# Patient Record
Sex: FEMALE | Race: ASIAN | NOT HISPANIC OR LATINO
[De-identification: names, ages, dates, MRNs, and addresses within clinical notes are randomized per-mention and may not be internally consistent; named-entity substitution may affect disease eponyms.]

---

## 2018-01-19 ENCOUNTER — RX RENEWAL (OUTPATIENT)
Age: 68
End: 2018-01-19

## 2018-01-23 ENCOUNTER — RX RENEWAL (OUTPATIENT)
Age: 68
End: 2018-01-23

## 2018-02-14 ENCOUNTER — RX RENEWAL (OUTPATIENT)
Age: 68
End: 2018-02-14

## 2018-04-09 ENCOUNTER — APPOINTMENT (OUTPATIENT)
Dept: CARDIOLOGY | Facility: CLINIC | Age: 68
End: 2018-04-09

## 2018-04-18 ENCOUNTER — APPOINTMENT (OUTPATIENT)
Dept: CARDIOLOGY | Facility: CLINIC | Age: 68
End: 2018-04-18
Payer: MEDICARE

## 2018-04-18 ENCOUNTER — NON-APPOINTMENT (OUTPATIENT)
Age: 68
End: 2018-04-18

## 2018-04-18 VITALS
WEIGHT: 155 LBS | DIASTOLIC BLOOD PRESSURE: 82 MMHG | BODY MASS INDEX: 28.52 KG/M2 | RESPIRATION RATE: 16 BRPM | HEIGHT: 62 IN | SYSTOLIC BLOOD PRESSURE: 125 MMHG | HEART RATE: 75 BPM

## 2018-04-18 DIAGNOSIS — Z78.9 OTHER SPECIFIED HEALTH STATUS: ICD-10-CM

## 2018-04-18 DIAGNOSIS — Z86.39 PERSONAL HISTORY OF OTHER ENDOCRINE, NUTRITIONAL AND METABOLIC DISEASE: ICD-10-CM

## 2018-04-18 PROCEDURE — 99213 OFFICE O/P EST LOW 20 MIN: CPT

## 2018-04-18 PROCEDURE — 93000 ELECTROCARDIOGRAM COMPLETE: CPT

## 2018-07-09 ENCOUNTER — RX RENEWAL (OUTPATIENT)
Age: 68
End: 2018-07-09

## 2018-09-25 ENCOUNTER — RX RENEWAL (OUTPATIENT)
Age: 68
End: 2018-09-25

## 2018-10-17 ENCOUNTER — APPOINTMENT (OUTPATIENT)
Dept: CARDIOLOGY | Facility: CLINIC | Age: 68
End: 2018-10-17
Payer: MEDICARE

## 2018-10-17 ENCOUNTER — NON-APPOINTMENT (OUTPATIENT)
Age: 68
End: 2018-10-17

## 2018-10-17 VITALS
RESPIRATION RATE: 16 BRPM | BODY MASS INDEX: 27.6 KG/M2 | DIASTOLIC BLOOD PRESSURE: 80 MMHG | HEART RATE: 64 BPM | SYSTOLIC BLOOD PRESSURE: 127 MMHG | WEIGHT: 150 LBS | HEIGHT: 62 IN

## 2018-10-17 PROCEDURE — 93000 ELECTROCARDIOGRAM COMPLETE: CPT

## 2018-10-17 PROCEDURE — 99213 OFFICE O/P EST LOW 20 MIN: CPT

## 2019-04-10 ENCOUNTER — APPOINTMENT (OUTPATIENT)
Dept: CARDIOLOGY | Facility: CLINIC | Age: 69
End: 2019-04-10
Payer: MEDICARE

## 2019-04-10 ENCOUNTER — NON-APPOINTMENT (OUTPATIENT)
Age: 69
End: 2019-04-10

## 2019-04-10 VITALS
WEIGHT: 151 LBS | RESPIRATION RATE: 15 BRPM | DIASTOLIC BLOOD PRESSURE: 78 MMHG | SYSTOLIC BLOOD PRESSURE: 125 MMHG | HEIGHT: 62 IN | HEART RATE: 70 BPM | BODY MASS INDEX: 27.79 KG/M2

## 2019-04-10 PROCEDURE — 93000 ELECTROCARDIOGRAM COMPLETE: CPT

## 2019-04-10 PROCEDURE — 99213 OFFICE O/P EST LOW 20 MIN: CPT

## 2019-04-10 RX ORDER — METFORMIN HYDROCHLORIDE 500 MG/1
500 TABLET, COATED ORAL
Refills: 0 | Status: COMPLETED | COMMUNITY
End: 2019-04-10

## 2019-05-02 ENCOUNTER — RX RENEWAL (OUTPATIENT)
Age: 69
End: 2019-05-02

## 2019-06-12 RX ORDER — TELMISARTAN AND HYDROCHLOROTHIAZIDE 80; 25 MG/1; MG/1
80-25 TABLET ORAL
Qty: 90 | Refills: 1 | Status: DISCONTINUED | COMMUNITY
Start: 2018-01-19 | End: 2019-06-12

## 2019-07-22 ENCOUNTER — APPOINTMENT (OUTPATIENT)
Dept: CARDIOLOGY | Facility: CLINIC | Age: 69
End: 2019-07-22
Payer: MEDICARE

## 2019-07-22 VITALS
HEIGHT: 62 IN | BODY MASS INDEX: 28.16 KG/M2 | DIASTOLIC BLOOD PRESSURE: 80 MMHG | SYSTOLIC BLOOD PRESSURE: 130 MMHG | RESPIRATION RATE: 15 BRPM | WEIGHT: 153 LBS | HEART RATE: 60 BPM

## 2019-07-22 PROCEDURE — 99213 OFFICE O/P EST LOW 20 MIN: CPT

## 2019-07-23 RX ORDER — LABETALOL HYDROCHLORIDE 200 MG/1
200 TABLET, FILM COATED ORAL DAILY
Qty: 90 | Refills: 1 | Status: COMPLETED | COMMUNITY
Start: 2018-01-23 | End: 2019-07-23

## 2019-07-23 RX ORDER — OMEGA-3/DHA/EPA/FISH OIL 300-1000MG
1000 CAPSULE ORAL DAILY
Refills: 0 | Status: COMPLETED | COMMUNITY
Start: 2019-04-10 | End: 2019-07-23

## 2019-08-28 ENCOUNTER — CLINICAL ADVICE (OUTPATIENT)
Age: 69
End: 2019-08-28

## 2019-08-28 RX ORDER — TELMISARTAN 80 MG/1
80 TABLET ORAL
Qty: 90 | Refills: 1 | Status: DISCONTINUED | COMMUNITY
Start: 2019-06-12 | End: 2019-08-28

## 2019-09-06 ENCOUNTER — CLINICAL ADVICE (OUTPATIENT)
Age: 69
End: 2019-09-06

## 2019-09-11 ENCOUNTER — RX RENEWAL (OUTPATIENT)
Age: 69
End: 2019-09-11

## 2019-10-03 ENCOUNTER — RX RENEWAL (OUTPATIENT)
Age: 69
End: 2019-10-03

## 2019-10-08 ENCOUNTER — APPOINTMENT (OUTPATIENT)
Dept: CARDIOLOGY | Facility: CLINIC | Age: 69
End: 2019-10-08
Payer: MEDICARE

## 2019-10-08 VITALS
HEIGHT: 62 IN | WEIGHT: 151 LBS | HEART RATE: 64 BPM | DIASTOLIC BLOOD PRESSURE: 80 MMHG | BODY MASS INDEX: 27.79 KG/M2 | SYSTOLIC BLOOD PRESSURE: 127 MMHG | RESPIRATION RATE: 15 BRPM

## 2019-10-08 PROCEDURE — 93306 TTE W/DOPPLER COMPLETE: CPT

## 2019-10-08 PROCEDURE — ZZZZZ: CPT

## 2019-10-08 PROCEDURE — 93015 CV STRESS TEST SUPVJ I&R: CPT

## 2019-10-08 PROCEDURE — 99213 OFFICE O/P EST LOW 20 MIN: CPT | Mod: 25

## 2020-07-01 ENCOUNTER — NON-APPOINTMENT (OUTPATIENT)
Age: 70
End: 2020-07-01

## 2020-07-01 ENCOUNTER — APPOINTMENT (OUTPATIENT)
Dept: CARDIOLOGY | Facility: CLINIC | Age: 70
End: 2020-07-01
Payer: MEDICARE

## 2020-07-01 VITALS
DIASTOLIC BLOOD PRESSURE: 79 MMHG | WEIGHT: 144 LBS | HEIGHT: 62 IN | BODY MASS INDEX: 26.5 KG/M2 | HEART RATE: 70 BPM | RESPIRATION RATE: 15 BRPM | SYSTOLIC BLOOD PRESSURE: 123 MMHG

## 2020-07-01 PROCEDURE — 99213 OFFICE O/P EST LOW 20 MIN: CPT

## 2020-07-01 PROCEDURE — 93000 ELECTROCARDIOGRAM COMPLETE: CPT

## 2020-09-29 ENCOUNTER — APPOINTMENT (OUTPATIENT)
Dept: CARDIOLOGY | Facility: CLINIC | Age: 70
End: 2020-09-29
Payer: MEDICARE

## 2020-09-29 VITALS
HEIGHT: 62 IN | RESPIRATION RATE: 15 BRPM | HEART RATE: 70 BPM | WEIGHT: 148 LBS | BODY MASS INDEX: 27.23 KG/M2 | SYSTOLIC BLOOD PRESSURE: 140 MMHG | DIASTOLIC BLOOD PRESSURE: 90 MMHG

## 2020-09-29 PROCEDURE — 99213 OFFICE O/P EST LOW 20 MIN: CPT

## 2020-12-21 ENCOUNTER — APPOINTMENT (OUTPATIENT)
Dept: CARDIOLOGY | Facility: CLINIC | Age: 70
End: 2020-12-21
Payer: MEDICARE

## 2020-12-21 VITALS
HEIGHT: 62 IN | SYSTOLIC BLOOD PRESSURE: 135 MMHG | RESPIRATION RATE: 16 BRPM | OXYGEN SATURATION: 97 % | DIASTOLIC BLOOD PRESSURE: 88 MMHG | TEMPERATURE: 97.8 F | HEART RATE: 78 BPM | WEIGHT: 152 LBS | BODY MASS INDEX: 27.97 KG/M2

## 2020-12-21 PROCEDURE — 99213 OFFICE O/P EST LOW 20 MIN: CPT

## 2021-03-29 ENCOUNTER — LABORATORY RESULT (OUTPATIENT)
Age: 71
End: 2021-03-29

## 2021-03-29 ENCOUNTER — NON-APPOINTMENT (OUTPATIENT)
Age: 71
End: 2021-03-29

## 2021-03-29 ENCOUNTER — APPOINTMENT (OUTPATIENT)
Dept: CARDIOLOGY | Facility: CLINIC | Age: 71
End: 2021-03-29
Payer: MEDICARE

## 2021-03-29 VITALS
SYSTOLIC BLOOD PRESSURE: 127 MMHG | TEMPERATURE: 97.6 F | WEIGHT: 150 LBS | BODY MASS INDEX: 27.6 KG/M2 | DIASTOLIC BLOOD PRESSURE: 81 MMHG | HEIGHT: 62 IN | HEART RATE: 72 BPM | RESPIRATION RATE: 15 BRPM | OXYGEN SATURATION: 97 %

## 2021-03-29 DIAGNOSIS — R06.00 DYSPNEA, UNSPECIFIED: ICD-10-CM

## 2021-03-29 PROCEDURE — 93000 ELECTROCARDIOGRAM COMPLETE: CPT

## 2021-03-29 PROCEDURE — 99213 OFFICE O/P EST LOW 20 MIN: CPT

## 2021-03-29 PROCEDURE — ZZZZZ: CPT

## 2021-03-29 PROCEDURE — 93306 TTE W/DOPPLER COMPLETE: CPT

## 2021-03-29 NOTE — ASSESSMENT
[FreeTextEntry1] : This is a 70 year year old female here today for follow up cardiac followup evaluation. \par She has a past medical history significant for hypertension, hyperglycemia borderline diabetes mellitus, hypothyroidism, murmur, occasional palpitations.\par \par -Pt is stable from a cardiac standpoint and does not have any complaints at this time. \par -Cardiac risk factors include HTN, HLD, DM.. \par \par -EKG done 7/1/2020 which demonstrated regular sinus rhythm with nonspecific ST-T wave changes, BPM of 78.\par \par -The patient will schedule an Exercise Stress Test rule out significant coronary artery disease and will schedule \par an Echo Doppler examination to evaluate murmur, left ventricular function, chamber size, and rule out hypertrophy.\par \par I have discussed the plan of care with Ms. PRAVIN RANKIN  and she  will follow up in 1 month. \par \par The patient understands that aerobic exercises must be increased to minutes 4 times/week and a detailed discussion of lifestyle modification was done today. \par The patient has a good understanding of the diagnosis, treatment plan and lifestyle modification. \par She will contact me at the office for any questions with their care or any changes in their health status.\par \par

## 2021-03-29 NOTE — DISCUSSION/SUMMARY
[FreeTextEntry1] : This is a 70-year-old female past medical history significant for hypertension, hyperglycemia borderline diabetes mellitus, hypothyroidism, murmur, occasional palpitations, comes in for followup cardiac evaluation.  She denies chest pain, shortness of breath, dizziness or syncope. \par The patient had a normal exercise stress test March 29, 2021.\par She will also have an echo Doppler examination later today to evaluate her left ventricular function, chamber size, murmur, and rule out hypertrophy.\par New blood work will also be done today for lipid panel SMA-20.\par Electrocardiogram done July 1, 2020 demonstrated normal sinus rhythm rate 62 bpm and is otherwise unremarkable.\par The patient will continue on her usual medications.  Her blood pressure is under good control and her lipid profile is at goal.  She will follow-up with her primary care physician regarding her diabetic care.\par The patient understands that aerobic exercises must be increased to 40 minutes 4 times per week. A detailed discussion of lifestyle modification was done today. The patient has a good understanding of the diagnosis, and treatment plan. Lifestyle modification was also outlined.

## 2021-03-29 NOTE — PHYSICAL EXAM
[General Appearance - Well Developed] : well developed [Normal Appearance] : normal appearance [Well Groomed] : well groomed [General Appearance - Well Nourished] : well nourished [No Deformities] : no deformities [General Appearance - In No Acute Distress] : no acute distress [Normal Conjunctiva] : the conjunctiva exhibited no abnormalities [Normal Oral Mucosa] : normal oral mucosa [Normal Jugular Venous A Waves Present] : normal jugular venous A waves present [Normal Jugular Venous V Waves Present] : normal jugular venous V waves present [No Jugular Venous Dinh A Waves] : no jugular venous dinh A waves [Respiration, Rhythm And Depth] : normal respiratory rhythm and effort [Exaggerated Use Of Accessory Muscles For Inspiration] : no accessory muscle use [Auscultation Breath Sounds / Voice Sounds] : lungs were clear to auscultation bilaterally [Bowel Sounds] : normal bowel sounds [Abdomen Soft] : soft [Abnormal Walk] : normal gait [Gait - Sufficient For Exercise Testing] : the gait was sufficient for exercise testing [Nail Clubbing] : no clubbing of the fingernails [Cyanosis, Localized] : no localized cyanosis [Skin Color & Pigmentation] : normal skin color and pigmentation [Skin Turgor] : normal skin turgor [] : no rash [Oriented To Time, Place, And Person] : oriented to person, place, and time [Affect] : the affect was normal [Mood] : the mood was normal [5th Left ICS - MCL] : palpated at the 5th LICS in the midclavicular line [Normal] : normal [No Precordial Heave] : no precordial heave was noted [Normal Rate] : normal [Normal S1] : normal S1 [Normal S2] : normal S2 [No Gallop] : no gallop heard [S3] : no S3 [S4] : no S4 [Click] : no click [Pericardial Rub] : no pericardial rub [II] : a grade 2 [Right Carotid Bruit] : no bruit heard over the right carotid [Left Carotid Bruit] : no bruit heard over the left carotid [Right Femoral Bruit] : no bruit heard over the right femoral artery [Left Femoral Bruit] : no bruit heard over the left femoral artery [2+] : left 2+ [No Abnormalities] : the abdominal aorta was not enlarged and no bruit was heard [No Pitting Edema] : no pitting edema present

## 2021-03-29 NOTE — REASON FOR VISIT
[Follow-Up - Clinic] : a clinic follow-up of [Dyspnea] : dyspnea [Hyperlipidemia] : hyperlipidemia [Hypertension] : hypertension [Mitral Regurgitation] : mitral regurgitation [FreeTextEntry1] : This is a 70 year year old female here today for follow up cardiac followup evaluation. \par She has a past medical history significant for hypertension, hyperglycemia borderline diabetes mellitus, hypothyroidism, murmur, occasional palpitations.\par \par Today she is feeling generally well. She does have occasional TUCKER when going up some stairs.\par She denies fever, chills, weight loss, malaise, rash, alteration bowel habits, weakness, abdominal  pain, bloating, changes in urination, visual disturbances, chest pain, headaches, dizziness, heart palpitations, recent episodes of syncope or falls at this time.\par \par

## 2021-09-20 ENCOUNTER — NON-APPOINTMENT (OUTPATIENT)
Age: 71
End: 2021-09-20

## 2021-09-20 ENCOUNTER — APPOINTMENT (OUTPATIENT)
Dept: CARDIOLOGY | Facility: CLINIC | Age: 71
End: 2021-09-20
Payer: MEDICARE

## 2021-09-20 VITALS
WEIGHT: 146 LBS | DIASTOLIC BLOOD PRESSURE: 84 MMHG | BODY MASS INDEX: 26.87 KG/M2 | HEIGHT: 62 IN | OXYGEN SATURATION: 98 % | HEART RATE: 69 BPM | SYSTOLIC BLOOD PRESSURE: 138 MMHG | TEMPERATURE: 97.8 F | RESPIRATION RATE: 16 BRPM

## 2021-09-20 PROCEDURE — 93000 ELECTROCARDIOGRAM COMPLETE: CPT

## 2021-09-20 PROCEDURE — 99214 OFFICE O/P EST MOD 30 MIN: CPT | Mod: 25

## 2021-09-20 NOTE — ASSESSMENT
[FreeTextEntry1] : This is a 71 year year old female here today for follow up cardiac followup evaluation. \par She has a past medical history significant for hypertension, hyperglycemia borderline diabetes mellitus, hypothyroidism, murmur, occasional palpitations.\par \par -Pt is stable from a cardiac standpoint and does not have any complaints at this time. \par \par -Cardiac risk factors include HTN, HLD, DM.\par \par BLOOD PRESSURE:\par -BP is well controlled in today's visit and patient is on Labetalol 100mg PO BID and Telmisartan HCTZ 80/12.5mg PO DAILY. \par \par -I have discussed the importance of maintaining good BP control and reviewed the newest guidelines with the patient while re-enforcing dietary sodium restrictions to no more than 2-3 g daily, DASH diet, life style modifications as well as the goal of maintaining ideal body weight with the patient today. I have advised the patient to avoid the use of over-the-counter medications/ supplements especially NSAIDS.\par \par I have reviewed with Ms. RANKIN that serious health consequences can occur when blood pressure is not well controlled and the need for strict compliance with medication and that optimal control can significantly reduce the risk of cardiovascular disease stroke, heart attack and other organ damage. They verbally expressed understanding of the fore mentioned serious health consequences to me today.\par \par BLOOD WORK:\par -New blood work was done by her PCP on 9/13/2021 which demonstrated normal lipid values. \par -The patient remains on Rosuvastatin 10mg PO DAILY.\par \par CHOLESTEROL CONTROL:\par -Patient will continue the advised  TLC diet and to continue follow-up for treatment of hyperlipidemia and repeat blood testing with diet and exercise. I have discussed different exercises and the importance of maintenance of optimal body weight. The importance of staying within guidelines and recommendations was stressed to the patient today and they acknowledged that they understand this to me verbally.\par \par  -Ms. RANKIN was educated and advised that failure to follow-up with my medical recommendations to lower cholesterol can result in severe health consequences therefore, they will continuing a low saturated and low fat diet and to avoid excessive carbohydrates to help reduce triglycerides and that lowering LDL levels is associated with a significant decrease in serious cardiac events including but not limited to heart attack stroke and overall death. We will continue lipid lowering agents as advised based on blood test results and the patient understands to call if they develop severe muscle discomfort or if they have a reddish tinted discoloration in their urine.\par \par DIABETIC CONTROL:\par The patient's blood glucose consistent with their diabetes. A1C is reported to be 6.9%.\par I will advise the patient to keep try to stay on a low carbohydrate diet lose weight and exercise to reduce spikes in their blood sugar.  The importance of monitoring blood sugar regularly and HgBA1c level were reviewed. I have also discussed annual eye exams to prevent blindness,  monitoring urine microalbumin regularly to check for kidney damage and the importance of proper foot care and regularly checking feet to prevent sores and possibly loss of limbs was reviewed. \par \par TESTING/REPORTS:\par -EKG done Sep 20, 2021 which demonstrated regular sinus rhythm with nonspecific ST-T wave changes, BPM of 69.\par \par -The patient had an echocardiogram done on 3/29/2021 demonstrated mild MR, AR and TR with normal left ventricular systolic function. \par -EF on echo reported to be 65%.\par \par -The patient had a normal exercise stress test done on 3/29/2021.\par \par PLAN:\par -She will continue with her usual medications and will contact the office if she is having any complaints between now and their next follow up appointment.\par -She will follow up with her endocrinologist as needed.\par \par I have discussed the plan of care with Ms. PRAVIN RANKIN  and she  will follow up in 3 months. She is compliant with all of her medications.\par \par The patient understands that aerobic exercises must be increased to minutes 4 times/week and a detailed discussion of lifestyle modification was done today. \par The patient has a good understanding of the diagnosis, treatment plan and lifestyle modification. \par She will contact me at the office for any questions with their care or any changes in their health status.\par \par The plan of care was discussed with the patient with supervision physician Dr. Micky Kurtz and will be carried out as noted above.\par \par Valeriano STUART \par

## 2021-09-20 NOTE — PHYSICAL EXAM
[Well Developed] : well developed [Well Nourished] : well nourished [No Acute Distress] : no acute distress [Normal Venous Pressure] : normal venous pressure [No Carotid Bruit] : no carotid bruit [Normal S1, S2] : normal S1, S2 [No Murmur] : no murmur [No Rub] : no rub [Clear Lung Fields] : clear lung fields [Good Air Entry] : good air entry [No Respiratory Distress] : no respiratory distress  [Soft] : abdomen soft [Non Tender] : non-tender [No Masses/organomegaly] : no masses/organomegaly [Normal Bowel Sounds] : normal bowel sounds [Normal Gait] : normal gait [No Edema] : no edema [No Cyanosis] : no cyanosis [No Clubbing] : no clubbing [No Varicosities] : no varicosities [No Rash] : no rash [No Skin Lesions] : no skin lesions [Moves all extremities] : moves all extremities [No Focal Deficits] : no focal deficits [Alert and Oriented] : alert and oriented [Normal Speech] : normal speech [Normal memory] : normal memory [General Appearance - Well Developed] : well developed [Normal Appearance] : normal appearance [Well Groomed] : well groomed [General Appearance - Well Nourished] : well nourished [No Deformities] : no deformities [General Appearance - In No Acute Distress] : no acute distress [Normal Conjunctiva] : the conjunctiva exhibited no abnormalities [Normal Oral Mucosa] : normal oral mucosa [Normal Jugular Venous A Waves Present] : normal jugular venous A waves present [Normal Jugular Venous V Waves Present] : normal jugular venous V waves present [No Jugular Venous Dinh A Waves] : no jugular venous dinh A waves [Respiration, Rhythm And Depth] : normal respiratory rhythm and effort [Exaggerated Use Of Accessory Muscles For Inspiration] : no accessory muscle use [Auscultation Breath Sounds / Voice Sounds] : lungs were clear to auscultation bilaterally [Bowel Sounds] : normal bowel sounds [Abdomen Soft] : soft [Abnormal Walk] : normal gait [Gait - Sufficient For Exercise Testing] : the gait was sufficient for exercise testing [Nail Clubbing] : no clubbing of the fingernails [Cyanosis, Localized] : no localized cyanosis [Skin Color & Pigmentation] : normal skin color and pigmentation [Skin Turgor] : normal skin turgor [] : no rash [Oriented To Time, Place, And Person] : oriented to person, place, and time [Affect] : the affect was normal [Mood] : the mood was normal [5th Left ICS - MCL] : palpated at the 5th LICS in the midclavicular line [Normal] : normal [No Precordial Heave] : no precordial heave was noted [Normal Rate] : normal [Normal S1] : normal S1 [Normal S2] : normal S2 [No Gallop] : no gallop heard [II] : a grade 2 [2+] : left 2+ [No Abnormalities] : the abdominal aorta was not enlarged and no bruit was heard [No Pitting Edema] : no pitting edema present [S3] : no S3 [S4] : no S4 [Click] : no click [Pericardial Rub] : no pericardial rub [Right Carotid Bruit] : no bruit heard over the right carotid [Left Carotid Bruit] : no bruit heard over the left carotid [Right Femoral Bruit] : no bruit heard over the right femoral artery [Left Femoral Bruit] : no bruit heard over the left femoral artery

## 2021-09-20 NOTE — REASON FOR VISIT
[CV Risk Factors and Non-Cardiac Disease] : CV risk factors and non-cardiac disease [Follow-Up - Clinic] : a clinic follow-up of [Dyspnea] : dyspnea [Hyperlipidemia] : hyperlipidemia [Hypertension] : hypertension [Mitral Regurgitation] : mitral regurgitation [FreeTextEntry1] : hypothyroidism, murmur, NIDDM

## 2021-09-20 NOTE — DISCUSSION/SUMMARY
[FreeTextEntry1] : Dr. Kurtz-(PRIOR VISIT and PMH WITH Dr. Kurtz): \par This is a 70-year-old female past medical history significant for hypertension, hyperglycemia borderline diabetes mellitus, hypothyroidism, murmur, occasional palpitations, comes in for followup cardiac evaluation.  She denies chest pain, shortness of breath, dizziness or syncope. \par \par The patient had a normal exercise stress test March 29, 2021.\par \par She will also have an echo Doppler examination later today to evaluate her left ventricular function, chamber size, murmur, and rule out hypertrophy.\par \par New blood work will also be done today for lipid panel SMA-20.\par \par Electrocardiogram done July 1, 2020 demonstrated normal sinus rhythm rate 62 bpm and is otherwise unremarkable.\par \par The patient will continue on her usual medications.  Her blood pressure is under good control and her lipid profile is at goal.  She will follow-up with her primary care physician regarding her diabetic care.\par \par The patient understands that aerobic exercises must be increased to 40 minutes 4 times per week. A detailed discussion of lifestyle modification was done today. The patient has a good understanding of the diagnosis, and treatment plan. Lifestyle modification was also outlined.

## 2021-09-20 NOTE — CARDIOLOGY SUMMARY
[___] : [unfilled] [de-identified] : 9/20/2021 [de-identified] : 3/29/2021 [de-identified] : 3/29/2021

## 2022-03-22 ENCOUNTER — NON-APPOINTMENT (OUTPATIENT)
Age: 72
End: 2022-03-22

## 2022-03-22 ENCOUNTER — APPOINTMENT (OUTPATIENT)
Dept: CARDIOLOGY | Facility: CLINIC | Age: 72
End: 2022-03-22
Payer: MEDICARE

## 2022-03-22 VITALS
HEIGHT: 62 IN | TEMPERATURE: 97 F | SYSTOLIC BLOOD PRESSURE: 122 MMHG | RESPIRATION RATE: 16 BRPM | DIASTOLIC BLOOD PRESSURE: 76 MMHG | HEART RATE: 73 BPM | BODY MASS INDEX: 26.68 KG/M2 | OXYGEN SATURATION: 97 % | WEIGHT: 145 LBS

## 2022-03-22 PROCEDURE — 99214 OFFICE O/P EST MOD 30 MIN: CPT

## 2022-03-22 PROCEDURE — 93000 ELECTROCARDIOGRAM COMPLETE: CPT

## 2022-03-22 NOTE — ASSESSMENT
[FreeTextEntry1] : Prior note nurse practitioner Che[place]ne September 20, 2021::\par \par This is a 71 year year old female here today for follow up cardiac followup evaluation. \par She has a past medical history significant for hypertension, hyperglycemia borderline diabetes mellitus, hypothyroidism, murmur, occasional palpitations.\par \par -Pt is stable from a cardiac standpoint and does not have any complaints at this time. \par \par -Cardiac risk factors include HTN, HLD, DM.\par \par BLOOD PRESSURE:\par -BP is well controlled in today's visit and patient is on Labetalol 100mg PO BID and Telmisartan HCTZ 80/12.5mg PO DAILY. \par \par -I have discussed the importance of maintaining good BP control and reviewed the newest guidelines with the patient while re-enforcing dietary sodium restrictions to no more than 2-3 g daily, DASH diet, life style modifications as well as the goal of maintaining ideal body weight with the patient today. I have advised the patient to avoid the use of over-the-counter medications/ supplements especially NSAIDS.\par \par I have reviewed with Ms. RANKIN that serious health consequences can occur when blood pressure is not well controlled and the need for strict compliance with medication and that optimal control can significantly reduce the risk of cardiovascular disease stroke, heart attack and other organ damage. They verbally expressed understanding of the fore mentioned serious health consequences to me today.\par \par BLOOD WORK:\par -New blood work was done by her PCP on 9/13/2021 which demonstrated normal lipid values. \par -The patient remains on Rosuvastatin 10mg PO DAILY.\par \par CHOLESTEROL CONTROL:\par -Patient will continue the advised  TLC diet and to continue follow-up for treatment of hyperlipidemia and repeat blood testing with diet and exercise. I have discussed different exercises and the importance of maintenance of optimal body weight. The importance of staying within guidelines and recommendations was stressed to the patient today and they acknowledged that they understand this to me verbally.\par \par  -Ms. RANKIN was educated and advised that failure to follow-up with my medical recommendations to lower cholesterol can result in severe health consequences therefore, they will continuing a low saturated and low fat diet and to avoid excessive carbohydrates to help reduce triglycerides and that lowering LDL levels is associated with a significant decrease in serious cardiac events including but not limited to heart attack stroke and overall death. We will continue lipid lowering agents as advised based on blood test results and the patient understands to call if they develop severe muscle discomfort or if they have a reddish tinted discoloration in their urine.\par \par DIABETIC CONTROL:\par The patient's blood glucose consistent with their diabetes. A1C is reported to be 6.9%.\par I will advise the patient to keep try to stay on a low carbohydrate diet lose weight and exercise to reduce spikes in their blood sugar.  The importance of monitoring blood sugar regularly and HgBA1c level were reviewed. I have also discussed annual eye exams to prevent blindness,  monitoring urine microalbumin regularly to check for kidney damage and the importance of proper foot care and regularly checking feet to prevent sores and possibly loss of limbs was reviewed. \par \par TESTING/REPORTS:\par -EKG done Sep 20, 2021 which demonstrated regular sinus rhythm with nonspecific ST-T wave changes, BPM of 69.\par \par -The patient had an echocardiogram done on 3/29/2021 demonstrated mild MR, AR and TR with normal left ventricular systolic function. \par -EF on echo reported to be 65%.\par \par -The patient had a normal exercise stress test done on 3/29/2021.\par \par PLAN:\par -She will continue with her usual medications and will contact the office if she is having any complaints between now and their next follow up appointment.\par -She will follow up with her endocrinologist as needed.\par \par I have discussed the plan of care with Ms. PRAVIN RANKIN  and she  will follow up in 3 months. She is compliant with all of her medications.\par \par The patient understands that aerobic exercises must be increased to minutes 4 times/week and a detailed discussion of lifestyle modification was done today. \par The patient has a good understanding of the diagnosis, treatment plan and lifestyle modification. \par She will contact me at the office for any questions with their care or any changes in their health status.\par \par The plan of care was discussed with the patient with supervision physician Dr. Micky Kurtz and will be carried out as noted above.\par \par Valeriano STUART \par

## 2022-03-22 NOTE — DISCUSSION/SUMMARY
[FreeTextEntry1] : This is a 71-year-old female past medical history significant for hypertension, hyperglycemia borderline diabetes mellitus, hypothyroidism, murmur, occasional palpitations, comes in for followup cardiac evaluation.  She denies chest pain, shortness of breath, dizziness or syncope. \par Electrocardiogram done March 22, 2022 demonstrate normal sinus rhythm rate of 73 bpm is otherwise remarkable for first-degree atrioventricular block.\par Blood work done March 8, 2022 demonstrated a cholesterol of 111, triglycerides of 92, HDL of 40, LDL calculated 53 mg/dL.\par The patient had a normal exercise stress test March 29, 2021.\par The patient will remain on her usual medications.  She will also increase her daily aerobic exercise.\par Echo Doppler examination done March 29, 2021 demonstrated calcified mitral annulus and mild mitral valve regurgitation, minimal aortic valve regurgitation, mild tricuspid valve regurgitation, thinning and dyskinesis of the interatrial septum, with normal ejection fraction of 65%.\par She will continue on her current diet and exercise program.\par \par Electrocardiogram done July 1, 2020 demonstrated normal sinus rhythm rate 62 bpm and is otherwise unremarkable.\par \par The patient understands that aerobic exercises must be increased to 40 minutes 4 times per week. A detailed discussion of lifestyle modification was done today. The patient has a good understanding of the diagnosis, and treatment plan. Lifestyle modification was also outlined.

## 2022-03-22 NOTE — CARDIOLOGY SUMMARY
[___] : [unfilled] [de-identified] : 9/20/2021 [de-identified] : 3/29/2021 [de-identified] : 3/29/2021

## 2022-03-22 NOTE — PHYSICAL EXAM
[Well Developed] : well developed [Well Nourished] : well nourished [No Acute Distress] : no acute distress [Normal Venous Pressure] : normal venous pressure [No Carotid Bruit] : no carotid bruit [Normal S1, S2] : normal S1, S2 [No Murmur] : no murmur [No Rub] : no rub [Clear Lung Fields] : clear lung fields [Good Air Entry] : good air entry [No Respiratory Distress] : no respiratory distress  [Soft] : abdomen soft [Non Tender] : non-tender [No Masses/organomegaly] : no masses/organomegaly [Normal Bowel Sounds] : normal bowel sounds [Normal Gait] : normal gait [No Edema] : no edema [No Cyanosis] : no cyanosis [No Clubbing] : no clubbing [No Varicosities] : no varicosities [No Rash] : no rash [No Skin Lesions] : no skin lesions [Moves all extremities] : moves all extremities [No Focal Deficits] : no focal deficits [Normal Speech] : normal speech [Alert and Oriented] : alert and oriented [Normal memory] : normal memory [General Appearance - Well Developed] : well developed [Normal Appearance] : normal appearance [Well Groomed] : well groomed [General Appearance - Well Nourished] : well nourished [No Deformities] : no deformities [General Appearance - In No Acute Distress] : no acute distress [Normal Conjunctiva] : the conjunctiva exhibited no abnormalities [Normal Oral Mucosa] : normal oral mucosa [Normal Jugular Venous A Waves Present] : normal jugular venous A waves present [Normal Jugular Venous V Waves Present] : normal jugular venous V waves present [No Jugular Venous Dinh A Waves] : no jugular venous dinh A waves [Respiration, Rhythm And Depth] : normal respiratory rhythm and effort [Exaggerated Use Of Accessory Muscles For Inspiration] : no accessory muscle use [Auscultation Breath Sounds / Voice Sounds] : lungs were clear to auscultation bilaterally [Bowel Sounds] : normal bowel sounds [Abdomen Soft] : soft [Abnormal Walk] : normal gait [Gait - Sufficient For Exercise Testing] : the gait was sufficient for exercise testing [Nail Clubbing] : no clubbing of the fingernails [Cyanosis, Localized] : no localized cyanosis [Skin Color & Pigmentation] : normal skin color and pigmentation [Skin Turgor] : normal skin turgor [] : no rash [Oriented To Time, Place, And Person] : oriented to person, place, and time [Affect] : the affect was normal [Mood] : the mood was normal [5th Left ICS - MCL] : palpated at the 5th LICS in the midclavicular line [Normal] : normal [No Precordial Heave] : no precordial heave was noted [Normal Rate] : normal [Normal S1] : normal S1 [Normal S2] : normal S2 [No Gallop] : no gallop heard [II] : a grade 2 [2+] : left 2+ [No Abnormalities] : the abdominal aorta was not enlarged and no bruit was heard [No Pitting Edema] : no pitting edema present [S3] : no S3 [S4] : no S4 [Click] : no click [Pericardial Rub] : no pericardial rub [Right Carotid Bruit] : no bruit heard over the right carotid [Left Carotid Bruit] : no bruit heard over the left carotid [Right Femoral Bruit] : no bruit heard over the right femoral artery [Left Femoral Bruit] : no bruit heard over the left femoral artery

## 2022-09-30 ENCOUNTER — NON-APPOINTMENT (OUTPATIENT)
Age: 72
End: 2022-09-30

## 2022-09-30 ENCOUNTER — APPOINTMENT (OUTPATIENT)
Dept: CARDIOLOGY | Facility: CLINIC | Age: 72
End: 2022-09-30

## 2022-09-30 VITALS
HEART RATE: 59 BPM | RESPIRATION RATE: 18 BRPM | WEIGHT: 143 LBS | DIASTOLIC BLOOD PRESSURE: 72 MMHG | BODY MASS INDEX: 26.31 KG/M2 | OXYGEN SATURATION: 99 % | TEMPERATURE: 97.2 F | HEIGHT: 62 IN | SYSTOLIC BLOOD PRESSURE: 118 MMHG

## 2022-09-30 PROCEDURE — 93000 ELECTROCARDIOGRAM COMPLETE: CPT

## 2022-09-30 PROCEDURE — 99214 OFFICE O/P EST MOD 30 MIN: CPT | Mod: 25

## 2022-09-30 NOTE — PHYSICAL EXAM
[Well Developed] : well developed [Well Nourished] : well nourished [No Acute Distress] : no acute distress [Normal Venous Pressure] : normal venous pressure [No Carotid Bruit] : no carotid bruit [Normal S1, S2] : normal S1, S2 [No Murmur] : no murmur [No Rub] : no rub [Clear Lung Fields] : clear lung fields [Good Air Entry] : good air entry [No Respiratory Distress] : no respiratory distress  [Soft] : abdomen soft [Non Tender] : non-tender [No Masses/organomegaly] : no masses/organomegaly [Normal Bowel Sounds] : normal bowel sounds [Normal Gait] : normal gait [No Edema] : no edema [No Cyanosis] : no cyanosis [No Clubbing] : no clubbing [No Varicosities] : no varicosities [No Rash] : no rash [No Skin Lesions] : no skin lesions [Moves all extremities] : moves all extremities [No Focal Deficits] : no focal deficits [Normal Speech] : normal speech [Alert and Oriented] : alert and oriented [Normal memory] : normal memory [General Appearance - Well Developed] : well developed [Normal Appearance] : normal appearance [Well Groomed] : well groomed [General Appearance - Well Nourished] : well nourished [No Deformities] : no deformities [General Appearance - In No Acute Distress] : no acute distress [Normal Conjunctiva] : the conjunctiva exhibited no abnormalities [Normal Oral Mucosa] : normal oral mucosa [Normal Jugular Venous A Waves Present] : normal jugular venous A waves present [Normal Jugular Venous V Waves Present] : normal jugular venous V waves present [No Jugular Venous Dinh A Waves] : no jugular venous dinh A waves [Respiration, Rhythm And Depth] : normal respiratory rhythm and effort [Exaggerated Use Of Accessory Muscles For Inspiration] : no accessory muscle use [Auscultation Breath Sounds / Voice Sounds] : lungs were clear to auscultation bilaterally [Bowel Sounds] : normal bowel sounds [Abdomen Soft] : soft [Abnormal Walk] : normal gait [Gait - Sufficient For Exercise Testing] : the gait was sufficient for exercise testing [Nail Clubbing] : no clubbing of the fingernails [Cyanosis, Localized] : no localized cyanosis [Skin Color & Pigmentation] : normal skin color and pigmentation [Skin Turgor] : normal skin turgor [] : no rash [Oriented To Time, Place, And Person] : oriented to person, place, and time [Affect] : the affect was normal [Mood] : the mood was normal [5th Left ICS - MCL] : palpated at the 5th LICS in the midclavicular line [Normal] : normal [No Precordial Heave] : no precordial heave was noted [Normal Rate] : normal [Normal S1] : normal S1 [Normal S2] : normal S2 [No Gallop] : no gallop heard [II] : a grade 2 [2+] : left 2+ [No Abnormalities] : the abdominal aorta was not enlarged and no bruit was heard [No Pitting Edema] : no pitting edema present [FreeTextEntry1] : carotid bruit vs transmitted murmur  [S3] : no S3 [S4] : no S4 [Click] : no click [Pericardial Rub] : no pericardial rub [Right Carotid Bruit] : no bruit heard over the right carotid [Left Carotid Bruit] : no bruit heard over the left carotid [Right Femoral Bruit] : no bruit heard over the right femoral artery [Left Femoral Bruit] : no bruit heard over the left femoral artery

## 2022-09-30 NOTE — DISCUSSION/SUMMARY
[FreeTextEntry1] : Dr. Kurtz-(PRIOR VISIT and PMH WITH Dr. Kurtz): \par This is a 71-year-old female past medical history significant for hypertension, hyperglycemia borderline diabetes mellitus, hypothyroidism, murmur, occasional palpitations, comes in for followup cardiac evaluation.  She denies chest pain, shortness of breath, dizziness or syncope. \par \par Electrocardiogram done March 22, 2022 demonstrate normal sinus rhythm rate of 73 bpm is otherwise remarkable for first-degree atrioventricular block.\par \par Blood work done March 8, 2022 demonstrated a cholesterol of 111, triglycerides of 92, HDL of 40, LDL calculated 53 mg/dL.\par \par The patient had a normal exercise stress test March 29, 2021.\par \par Echo Doppler examination done March 29, 2021 demonstrated calcified mitral annulus and mild mitral valve regurgitation, minimal aortic valve regurgitation, mild tricuspid valve regurgitation, thinning and dyskinesis of the interatrial septum, with normal ejection fraction of 65%.\par \par Electrocardiogram done July 1, 2020 demonstrated normal sinus rhythm rate 62 bpm and is otherwise unremarkable.\par \par She will continue on her current diet and exercise program.\par The patient will remain on her usual medications.  She will also increase her daily aerobic exercise.\par \par The patient understands that aerobic exercises must be increased to 40 minutes 4 times per week. A detailed discussion of lifestyle modification was done today. The patient has a good understanding of the diagnosis, and treatment plan. Lifestyle modification was also outlined.

## 2022-09-30 NOTE — CARDIOLOGY SUMMARY
[___] : [unfilled] [de-identified] : 9/20/2021 [de-identified] : 3/29/2021 [de-identified] : 3/29/2021

## 2022-09-30 NOTE — ASSESSMENT
[FreeTextEntry1] : This is a 72 year year old female here today for follow up cardiac evaluation. \par She has a past medical history significant for hypertension, hyperglycemia borderline diabetes mellitus, hypothyroidism, murmur, occasional palpitations.\par \par \par CHIEF COMPLAINT:\par Today she is feeling generally well and does not have any complaints at this time.  She is currently prescribed labetalol 100 mg twice daily, potassium chloride 10 M EQ's 1 tablet twice daily, Rosuvastatin 10 mg daily and telmisartan hydrochlorothiazide 80-12.5 mg daily.  She is on metformin for management of her diabetes.  She may have occasional dyspnea on exertion while going up some steps.  Otherwise she denies any chest pain or heart palpitations at this time.\par \par -Cardiac risk factors include HTN, HLD, DM.\par \par BLOOD PRESSURE:\par -BP is well controlled in today's visit \par \par -I have discussed the importance of maintaining good BP control and reviewed the newest guidelines with the patient while re-enforcing dietary sodium restrictions to no more than 2-3 g daily, DASH diet, life style modifications as well as the goal of maintaining ideal body weight with the patient today. I have advised the patient to avoid the use of over-the-counter medications/ supplements especially NSAIDS.\par \par I have reviewed with Ms. RANKIN that serious health consequences can occur when blood pressure is not well controlled and the need for strict compliance with medication and that optimal control can significantly reduce the risk of cardiovascular disease stroke, heart attack and other organ damage. They verbally expressed understanding of the fore mentioned serious health consequences to me today.\par \par BLOOD WORK:\par -The patient had blood work done from her primary care doctor which demonstrated normal lipid profile done on 9/13/2022.  Total cholesterol was 82, triglycerides 86, HDL 34 and calculated LDL of 31.  Her A1c was 6.9%.\par -Blood work done March 8, 2022 demonstrated a cholesterol of 111, triglycerides of 92, HDL of 40, LDL calculated 53 mg/dL.\par -New blood work was done by her PCP on 9/13/2021 which demonstrated normal lipid values. \par -The patient remains on Rosuvastatin 10mg PO DAILY.\par \par CHOLESTEROL CONTROL:\par -Patient will continue the advised  TLC diet and to continue follow-up for treatment of hyperlipidemia and repeat blood testing with diet and exercise. I have discussed different exercises and the importance of maintenance of optimal body weight. The importance of staying within guidelines and recommendations was stressed to the patient today and they acknowledged that they understand this to me verbally.\par \par  -Ms. RANKIN was educated and advised that failure to follow-up with my medical recommendations to lower cholesterol can result in severe health consequences therefore, they will continuing a low saturated and low fat diet and to avoid excessive carbohydrates to help reduce triglycerides and that lowering LDL levels is associated with a significant decrease in serious cardiac events including but not limited to heart attack stroke and overall death. We will continue lipid lowering agents as advised based on blood test results and the patient understands to call if they develop severe muscle discomfort or if they have a reddish tinted discoloration in their urine.\par \par DIABETIC CONTROL:\par I will advise the patient to keep try to stay on a low carbohydrate diet lose weight and exercise to reduce spikes in their blood sugar.  The importance of monitoring blood sugar regularly and HgBA1c level were reviewed. I have also discussed annual eye exams to prevent blindness,  monitoring urine microalbumin regularly to check for kidney damage and the importance of proper foot care and regularly checking feet to prevent sores and possibly loss of limbs was reviewed. \par \par TESTING/REPORTS:\par -EKG done 09/30/2022 which demonstrated regular sinus rhythm with nonspecific ST-T wave changes BPM of 73.\par \par -The patient had a normal exercise stress test March 29, 2021.\par \par -Echo Doppler examination done March 29, 2021 demonstrated calcified mitral annulus and mild mitral valve regurgitation, minimal aortic valve regurgitation, mild tricuspid valve regurgitation, thinning and dyskinesis of the interatrial septum, with normal ejection fraction of 65%.\par \par -EKG done Sep 20, 2021 which demonstrated regular sinus rhythm with nonspecific ST-T wave changes, BPM of 69.\par \par -Electrocardiogram done July 1, 2020 demonstrated normal sinus rhythm rate 62 bpm and is otherwise unremarkable.\par \par -The patient had an echocardiogram done on 3/29/2021 demonstrated mild MR, AR and TR with normal left ventricular systolic function. \par -EF on echo reported to be 65%.\par \par -The patient had a normal exercise stress test done on 3/29/2021.\par \par PLAN:\par -She will continue with her usual medications and will contact the office if she is having any complaints between now and their next follow up appointment.\par -Patient will schedule a carotid Doppler to evaluate for carotid plaque.\par -The patient will schedule an Exercise Stress Test rule out significant coronary artery disease.\par -The patient will schedule an Echo Doppler examination to evaluate murmur, left ventricular function, chamber size, and rule out hypertrophy. \par -She will follow up with her endocrinologist as needed.\par \par I have discussed the plan of care with Ms. PRAVIN RANKIN  and she  will follow up in 3 months. She is compliant with all of her medications.\par \par The patient understands that aerobic exercises must be increased to minutes 4 times/week and a detailed discussion of lifestyle modification was done today. \par The patient has a good understanding of the diagnosis, treatment plan and lifestyle modification. \par She will contact me at the office for any questions with their care or any changes in their health status.\par \par The plan of care was discussed with the patient with supervision physician Dr. Micky Kurtz and will be carried out as noted above.\par \par Valeriano STUART \par

## 2023-01-23 ENCOUNTER — RX RENEWAL (OUTPATIENT)
Age: 73
End: 2023-01-23

## 2023-04-03 ENCOUNTER — APPOINTMENT (OUTPATIENT)
Dept: CARDIOLOGY | Facility: CLINIC | Age: 73
End: 2023-04-03
Payer: MEDICARE

## 2023-04-03 VITALS
OXYGEN SATURATION: 95 % | SYSTOLIC BLOOD PRESSURE: 126 MMHG | RESPIRATION RATE: 16 BRPM | DIASTOLIC BLOOD PRESSURE: 79 MMHG | BODY MASS INDEX: 27.05 KG/M2 | HEIGHT: 62 IN | TEMPERATURE: 97.5 F | WEIGHT: 147 LBS | HEART RATE: 57 BPM

## 2023-04-03 DIAGNOSIS — Z00.00 ENCOUNTER FOR GENERAL ADULT MEDICAL EXAMINATION W/OUT ABNORMAL FINDINGS: ICD-10-CM

## 2023-04-03 DIAGNOSIS — R09.89 OTHER SPECIFIED SYMPTOMS AND SIGNS INVOLVING THE CIRCULATORY AND RESPIRATORY SYSTEMS: ICD-10-CM

## 2023-04-03 DIAGNOSIS — R06.09 OTHER FORMS OF DYSPNEA: ICD-10-CM

## 2023-04-03 PROCEDURE — 93880 EXTRACRANIAL BILAT STUDY: CPT

## 2023-04-03 PROCEDURE — 93306 TTE W/DOPPLER COMPLETE: CPT

## 2023-04-03 PROCEDURE — 99213 OFFICE O/P EST LOW 20 MIN: CPT | Mod: 25

## 2023-04-03 PROCEDURE — 93015 CV STRESS TEST SUPVJ I&R: CPT

## 2023-04-03 NOTE — CARDIOLOGY SUMMARY
[de-identified] : 9/20/2021 [de-identified] : 3/29/2021 [de-identified] : 3/29/2021 [___] : [unfilled]

## 2023-04-03 NOTE — DISCUSSION/SUMMARY
[FreeTextEntry1] : This is a 72-year-old female past medical history significant for hypertension, hyperglycemia borderline diabetes mellitus, hypothyroidism, murmur, occasional palpitations, comes in for followup cardiac evaluation.  She denies chest pain, shortness of breath, dizziness or syncope.\par The patient had normal exercise stress test April 3, 2023.\par The patient will continue on her usual medication.\par Lipid panel done March 14, 2023 demonstrated a cholesterol 109, triglycerides of 85, HDL 43, LDL calculated 49 mg/dL.\par Electrocardiogram done March 22, 2022 demonstrate normal sinus rhythm rate of 73 bpm is otherwise remarkable for first-degree atrioventricular block.\par Blood work done March 8, 2022 demonstrated a cholesterol of 111, triglycerides of 92, HDL of 40, LDL calculated 53 mg/dL.\par The patient had a normal exercise stress test March 29, 2021.\par The patient will remain on her usual medications.  She will also increase her daily aerobic exercise.\par Echo Doppler examination done March 29, 2021 demonstrated calcified mitral annulus and mild mitral valve regurgitation, minimal aortic valve regurgitation, mild tricuspid valve regurgitation, thinning and dyskinesis of the interatrial septum, with normal ejection fraction of 65%.\par She will continue on her current diet and exercise program.\par \par Electrocardiogram done July 1, 2020 demonstrated normal sinus rhythm rate 62 bpm and is otherwise unremarkable.\par \par The patient understands that aerobic exercises must be increased to 40 minutes 4 times per week. A detailed discussion of lifestyle modification was done today. The patient has a good understanding of the diagnosis, and treatment plan. Lifestyle modification was also outlined.

## 2023-04-03 NOTE — PHYSICAL EXAM
[Well Developed] : well developed [Well Nourished] : well nourished [No Acute Distress] : no acute distress [Normal Venous Pressure] : normal venous pressure [No Carotid Bruit] : no carotid bruit [Normal S1, S2] : normal S1, S2 [No Murmur] : no murmur [No Rub] : no rub [Clear Lung Fields] : clear lung fields [Good Air Entry] : good air entry [No Respiratory Distress] : no respiratory distress  [Soft] : abdomen soft [Non Tender] : non-tender [No Masses/organomegaly] : no masses/organomegaly [Normal Bowel Sounds] : normal bowel sounds [Normal Gait] : normal gait [No Edema] : no edema [No Cyanosis] : no cyanosis [No Clubbing] : no clubbing [No Varicosities] : no varicosities [No Rash] : no rash [No Skin Lesions] : no skin lesions [Moves all extremities] : moves all extremities [No Focal Deficits] : no focal deficits [Normal Speech] : normal speech [Alert and Oriented] : alert and oriented [Normal memory] : normal memory [General Appearance - Well Developed] : well developed [Normal Appearance] : normal appearance [Well Groomed] : well groomed [General Appearance - Well Nourished] : well nourished [No Deformities] : no deformities [General Appearance - In No Acute Distress] : no acute distress [Normal Conjunctiva] : the conjunctiva exhibited no abnormalities [Normal Oral Mucosa] : normal oral mucosa [Normal Jugular Venous A Waves Present] : normal jugular venous A waves present [Normal Jugular Venous V Waves Present] : normal jugular venous V waves present [No Jugular Venous Dinh A Waves] : no jugular venous dinh A waves [Respiration, Rhythm And Depth] : normal respiratory rhythm and effort [Exaggerated Use Of Accessory Muscles For Inspiration] : no accessory muscle use [Auscultation Breath Sounds / Voice Sounds] : lungs were clear to auscultation bilaterally [Bowel Sounds] : normal bowel sounds [Abdomen Soft] : soft [Abnormal Walk] : normal gait [Gait - Sufficient For Exercise Testing] : the gait was sufficient for exercise testing [Nail Clubbing] : no clubbing of the fingernails [Cyanosis, Localized] : no localized cyanosis [Skin Color & Pigmentation] : normal skin color and pigmentation [Skin Turgor] : normal skin turgor [] : no rash [Oriented To Time, Place, And Person] : oriented to person, place, and time [Affect] : the affect was normal [Mood] : the mood was normal [5th Left ICS - MCL] : palpated at the 5th LICS in the midclavicular line [Normal] : normal [No Precordial Heave] : no precordial heave was noted [Normal Rate] : normal [Normal S1] : normal S1 [Normal S2] : normal S2 [No Gallop] : no gallop heard [S3] : no S3 [S4] : no S4 [Click] : no click [Pericardial Rub] : no pericardial rub [II] : a grade 2 [Right Carotid Bruit] : no bruit heard over the right carotid [Left Carotid Bruit] : no bruit heard over the left carotid [Right Femoral Bruit] : no bruit heard over the right femoral artery [Left Femoral Bruit] : no bruit heard over the left femoral artery [2+] : left 2+ [No Abnormalities] : the abdominal aorta was not enlarged and no bruit was heard [No Pitting Edema] : no pitting edema present

## 2023-05-14 PROBLEM — R09.89 CAROTID ARTERY BRUIT: Status: ACTIVE | Noted: 2022-09-30

## 2023-05-14 PROBLEM — R06.09 DYSPNEA ON EXERTION: Status: ACTIVE | Noted: 2021-03-29

## 2023-09-11 ENCOUNTER — APPOINTMENT (OUTPATIENT)
Dept: CARDIOLOGY | Facility: CLINIC | Age: 73
End: 2023-09-11

## 2023-10-16 ENCOUNTER — APPOINTMENT (OUTPATIENT)
Dept: OBGYN | Facility: CLINIC | Age: 73
End: 2023-10-16
Payer: MEDICARE

## 2023-10-16 PROCEDURE — G0101: CPT | Mod: GA

## 2024-01-02 ENCOUNTER — APPOINTMENT (OUTPATIENT)
Dept: CARDIOLOGY | Facility: CLINIC | Age: 74
End: 2024-01-02
Payer: MEDICARE

## 2024-01-02 ENCOUNTER — NON-APPOINTMENT (OUTPATIENT)
Age: 74
End: 2024-01-02

## 2024-01-02 VITALS
HEART RATE: 72 BPM | TEMPERATURE: 97.4 F | OXYGEN SATURATION: 96 % | BODY MASS INDEX: 26.31 KG/M2 | HEIGHT: 62 IN | RESPIRATION RATE: 16 BRPM | WEIGHT: 143 LBS

## 2024-01-02 DIAGNOSIS — I44.0 ATRIOVENTRICULAR BLOCK, FIRST DEGREE: ICD-10-CM

## 2024-01-02 PROCEDURE — 93000 ELECTROCARDIOGRAM COMPLETE: CPT

## 2024-01-02 PROCEDURE — 99214 OFFICE O/P EST MOD 30 MIN: CPT

## 2024-01-02 NOTE — DISCUSSION/SUMMARY
[FreeTextEntry1] : This is a 73-year-old female past medical history significant for hypertension, hyperglycemia borderline diabetes mellitus, hypothyroidism, murmur, occasional palpitations, comes in for followup cardiac evaluation.  She denies chest pain, shortness of breath, dizziness or syncope. Due to computer glitch, the EHR vital sign is not functioning correctly. Blood pressure is 127/80, pulse 70 and regular respirations 16, O2 saturation 98%, height 5 foot 2 weight 143 pounds. Electrocardiogram done January 2, 2024 demonstrated normal sinus rhythm rate 72 bpm is otherwise unremarkable. Lipid profile done September 18, 2023 demonstrated cholesterol of 93, triglycerides of 74, HDL of 38, LDL calculated 39 mg/dL with a hemoglobin A1c of 6.7. She will follow-up with her primary care physician.  She is currently hemodynamically stable and asymptomatic from cardiac standpoint. The patient had normal exercise stress test April 3, 2023. The patient will continue on her usual medication. Lipid panel done March 14, 2023 demonstrated a cholesterol 109, triglycerides of 85, HDL 43, LDL calculated 49 mg/dL. Electrocardiogram done March 22, 2022 demonstrate normal sinus rhythm rate of 73 bpm is otherwise remarkable for first-degree atrioventricular block. Blood work done March 8, 2022 demonstrated a cholesterol of 111, triglycerides of 92, HDL of 40, LDL calculated 53 mg/dL. The patient had a normal exercise stress test March 29, 2021. The patient will remain on her usual medications.  She will also increase her daily aerobic exercise. Echo Doppler examination done March 29, 2021 demonstrated calcified mitral annulus and mild mitral valve regurgitation, minimal aortic valve regurgitation, mild tricuspid valve regurgitation, thinning and dyskinesis of the interatrial septum, with normal ejection fraction of 65%. She will continue on her current diet and exercise program.  Electrocardiogram done July 1, 2020 demonstrated normal sinus rhythm rate 62 bpm and is otherwise unremarkable.  The patient understands that aerobic exercises must be increased to 40 minutes 4 times per week. A detailed discussion of lifestyle modification was done today. The patient has a good understanding of the diagnosis, and treatment plan. Lifestyle modification was also outlined.

## 2024-01-02 NOTE — CARDIOLOGY SUMMARY
[___] : [unfilled] [de-identified] : 9/20/2021 [de-identified] : 3/29/2021 [de-identified] : 3/29/2021

## 2024-01-18 ENCOUNTER — RX RENEWAL (OUTPATIENT)
Age: 74
End: 2024-01-18

## 2024-01-18 RX ORDER — TELMISARTAN AND HYDROCHLOROTHIAZIDE 80; 12.5 MG/1; MG/1
80-12.5 TABLET ORAL
Qty: 90 | Refills: 1 | Status: ACTIVE | COMMUNITY
Start: 2019-08-28 | End: 1900-01-01

## 2024-01-31 ENCOUNTER — NON-APPOINTMENT (OUTPATIENT)
Age: 74
End: 2024-01-31

## 2024-01-31 RX ORDER — POTASSIUM CHLORIDE 750 MG/1
10 TABLET, FILM COATED, EXTENDED RELEASE ORAL TWICE DAILY
Qty: 180 | Refills: 1 | Status: ACTIVE | COMMUNITY
Start: 2018-02-14 | End: 1900-01-01

## 2024-04-11 RX ORDER — METFORMIN HYDROCHLORIDE 500 MG/1
500 TABLET, FILM COATED, EXTENDED RELEASE ORAL
Refills: 0 | Status: ACTIVE | COMMUNITY
Start: 2019-04-10

## 2024-06-18 ENCOUNTER — LABORATORY RESULT (OUTPATIENT)
Age: 74
End: 2024-06-18

## 2024-06-18 ENCOUNTER — NON-APPOINTMENT (OUTPATIENT)
Age: 74
End: 2024-06-18

## 2024-06-18 ENCOUNTER — APPOINTMENT (OUTPATIENT)
Dept: CARDIOLOGY | Facility: CLINIC | Age: 74
End: 2024-06-18
Payer: MEDICARE

## 2024-06-18 VITALS
HEART RATE: 64 BPM | RESPIRATION RATE: 16 BRPM | OXYGEN SATURATION: 98 % | DIASTOLIC BLOOD PRESSURE: 76 MMHG | TEMPERATURE: 97.9 F | SYSTOLIC BLOOD PRESSURE: 114 MMHG | BODY MASS INDEX: 26.13 KG/M2 | HEIGHT: 62 IN | WEIGHT: 142 LBS

## 2024-06-18 DIAGNOSIS — E78.5 HYPERLIPIDEMIA, UNSPECIFIED: ICD-10-CM

## 2024-06-18 DIAGNOSIS — E11.9 TYPE 2 DIABETES MELLITUS W/OUT COMPLICATIONS: ICD-10-CM

## 2024-06-18 DIAGNOSIS — I34.0 NONRHEUMATIC MITRAL (VALVE) INSUFFICIENCY: ICD-10-CM

## 2024-06-18 DIAGNOSIS — I10 ESSENTIAL (PRIMARY) HYPERTENSION: ICD-10-CM

## 2024-06-18 PROCEDURE — 93306 TTE W/DOPPLER COMPLETE: CPT

## 2024-06-18 PROCEDURE — 93000 ELECTROCARDIOGRAM COMPLETE: CPT

## 2024-06-18 PROCEDURE — 99214 OFFICE O/P EST MOD 30 MIN: CPT

## 2024-06-18 PROCEDURE — G2211 COMPLEX E/M VISIT ADD ON: CPT

## 2024-06-18 RX ORDER — LABETALOL HYDROCHLORIDE 100 MG/1
100 TABLET, FILM COATED ORAL
Qty: 180 | Refills: 1 | Status: ACTIVE | COMMUNITY
Start: 2019-07-22 | End: 1900-01-01

## 2024-06-18 RX ORDER — ROSUVASTATIN CALCIUM 10 MG/1
10 TABLET, FILM COATED ORAL
Qty: 90 | Refills: 1 | Status: ACTIVE | COMMUNITY
Start: 2018-04-18 | End: 1900-01-01

## 2024-06-18 NOTE — CARDIOLOGY SUMMARY
[___] : [unfilled] [de-identified] : 9/20/2021 [de-identified] : 3/29/2021 [de-identified] : 3/29/2021

## 2024-06-18 NOTE — DISCUSSION/SUMMARY
[FreeTextEntry1] : This is a 74-year-old female past medical history significant for hypertension, status post left knee replacement April 2024 and planning right knee replacement surgery July 25, 2024, diabetes mellitus, hypothyroidism, murmur, occasional palpitations, comes in for follow-up cardiac evaluation.  She denies chest pain, shortness of breath, dizziness or syncope. Electrocardiogram done June 18, 2024 demonstrated normal sinus rhythm rate 64 bpm is otherwise remarkable for juvenile T wave pattern. The patient will have new blood work done today for electrolytes and lipid profile. Blood work done September 18, 2023 demonstrated cholesterol 93, triglycerides 74, HDL 38, LDL calculated 39 mg/dL. The patient is currently hemodynamically stable and asymptomatic from a cardiac standpoint. Electrocardiogram done January 2, 2024 demonstrated normal sinus rhythm rate 72 bpm is otherwise unremarkable. Lipid profile done September 18, 2023 demonstrated cholesterol of 93, triglycerides of 74, HDL of 38, LDL calculated 39 mg/dL with a hemoglobin A1c of 6.7. She will follow-up with her primary care physician.  She is currently hemodynamically stable and asymptomatic from cardiac standpoint. The patient had normal exercise stress test April 3, 2023. Echo Doppler examination done April 3, 2023 demonstrated mild tricuspid valve regurgitation, trace aortic valve regurgitation, trace pulmonic valve regurgitation, minimal tricuspid valve regurgitation, and normal left ventricular ejection fraction of 64% with mild aortic valve thickening.  There was thinning and dyskinesis of the interatrial septum. Lipid panel done March 14, 2023 demonstrated a cholesterol 109, triglycerides of 85, HDL 43, LDL calculated 49 mg/dL. Electrocardiogram done March 22, 2022 demonstrate normal sinus rhythm rate of 73 bpm is otherwise remarkable for first-degree atrioventricular block. Blood work done March 8, 2022 demonstrated a cholesterol of 111, triglycerides of 92, HDL of 40, LDL calculated 53 mg/dL. The patient had a normal exercise stress test March 29, 2021. The patient will remain on her usual medications.  She will also increase her daily aerobic exercise. Echo Doppler examination done March 29, 2021 demonstrated calcified mitral annulus and mild mitral valve regurgitation, minimal aortic valve regurgitation, mild tricuspid valve regurgitation, thinning and dyskinesis of the interatrial septum, with normal ejection fraction of 65%. She will continue on her current diet and exercise program.  Electrocardiogram done July 1, 2020 demonstrated normal sinus rhythm rate 62 bpm and is otherwise unremarkable.  The patient understands that aerobic exercises must be increased to 40 minutes 4 times per week. A detailed discussion of lifestyle modification was done today. The patient has a good understanding of the diagnosis, and treatment plan. Lifestyle modification was also outlined.

## 2024-07-16 ENCOUNTER — RX RENEWAL (OUTPATIENT)
Age: 74
End: 2024-07-16

## 2024-08-07 ENCOUNTER — RX RENEWAL (OUTPATIENT)
Age: 74
End: 2024-08-07

## 2025-01-07 ENCOUNTER — APPOINTMENT (OUTPATIENT)
Dept: CARDIOLOGY | Facility: CLINIC | Age: 75
End: 2025-01-07
Payer: MEDICARE

## 2025-01-07 ENCOUNTER — NON-APPOINTMENT (OUTPATIENT)
Age: 75
End: 2025-01-07

## 2025-01-07 VITALS
BODY MASS INDEX: 27.48 KG/M2 | WEIGHT: 140 LBS | HEIGHT: 60 IN | RESPIRATION RATE: 16 BRPM | HEART RATE: 16 BPM | OXYGEN SATURATION: 97 % | TEMPERATURE: 98 F

## 2025-01-07 VITALS
HEART RATE: 61 BPM | SYSTOLIC BLOOD PRESSURE: 112 MMHG | TEMPERATURE: 98 F | RESPIRATION RATE: 16 BRPM | DIASTOLIC BLOOD PRESSURE: 73 MMHG | OXYGEN SATURATION: 97 % | BODY MASS INDEX: 26.43 KG/M2 | HEIGHT: 61 IN | WEIGHT: 140 LBS

## 2025-01-07 DIAGNOSIS — E78.5 HYPERLIPIDEMIA, UNSPECIFIED: ICD-10-CM

## 2025-01-07 DIAGNOSIS — E11.9 TYPE 2 DIABETES MELLITUS W/OUT COMPLICATIONS: ICD-10-CM

## 2025-01-07 DIAGNOSIS — I10 ESSENTIAL (PRIMARY) HYPERTENSION: ICD-10-CM

## 2025-01-07 DIAGNOSIS — I34.0 NONRHEUMATIC MITRAL (VALVE) INSUFFICIENCY: ICD-10-CM

## 2025-01-07 PROCEDURE — 93000 ELECTROCARDIOGRAM COMPLETE: CPT

## 2025-01-07 PROCEDURE — 99214 OFFICE O/P EST MOD 30 MIN: CPT

## 2025-01-07 PROCEDURE — G2211 COMPLEX E/M VISIT ADD ON: CPT

## 2025-01-13 ENCOUNTER — RX RENEWAL (OUTPATIENT)
Age: 75
End: 2025-01-13

## 2025-02-03 ENCOUNTER — RX RENEWAL (OUTPATIENT)
Age: 75
End: 2025-02-03

## 2025-07-11 ENCOUNTER — RX RENEWAL (OUTPATIENT)
Age: 75
End: 2025-07-11

## 2025-07-29 DIAGNOSIS — R09.89 OTHER SPECIFIED SYMPTOMS AND SIGNS INVOLVING THE CIRCULATORY AND RESPIRATORY SYSTEMS: ICD-10-CM

## 2025-08-04 ENCOUNTER — RX RENEWAL (OUTPATIENT)
Age: 75
End: 2025-08-04

## 2025-08-05 ENCOUNTER — APPOINTMENT (OUTPATIENT)
Dept: CARDIOLOGY | Facility: CLINIC | Age: 75
End: 2025-08-05
Payer: MEDICARE

## 2025-08-05 VITALS
BODY MASS INDEX: 26.81 KG/M2 | RESPIRATION RATE: 16 BRPM | SYSTOLIC BLOOD PRESSURE: 120 MMHG | TEMPERATURE: 97.2 F | OXYGEN SATURATION: 98 % | HEART RATE: 58 BPM | HEIGHT: 61 IN | WEIGHT: 142 LBS | DIASTOLIC BLOOD PRESSURE: 70 MMHG

## 2025-08-05 DIAGNOSIS — E11.9 TYPE 2 DIABETES MELLITUS W/OUT COMPLICATIONS: ICD-10-CM

## 2025-08-05 DIAGNOSIS — I10 ESSENTIAL (PRIMARY) HYPERTENSION: ICD-10-CM

## 2025-08-05 DIAGNOSIS — E78.5 HYPERLIPIDEMIA, UNSPECIFIED: ICD-10-CM

## 2025-08-05 DIAGNOSIS — I34.0 NONRHEUMATIC MITRAL (VALVE) INSUFFICIENCY: ICD-10-CM

## 2025-08-05 DIAGNOSIS — I44.0 ATRIOVENTRICULAR BLOCK, FIRST DEGREE: ICD-10-CM

## 2025-08-05 PROCEDURE — 93000 ELECTROCARDIOGRAM COMPLETE: CPT

## 2025-08-05 PROCEDURE — 93306 TTE W/DOPPLER COMPLETE: CPT

## 2025-08-05 PROCEDURE — 99214 OFFICE O/P EST MOD 30 MIN: CPT

## 2025-08-05 PROCEDURE — G2211 COMPLEX E/M VISIT ADD ON: CPT

## 2025-08-05 PROCEDURE — 93880 EXTRACRANIAL BILAT STUDY: CPT
